# Patient Record
Sex: FEMALE | Race: WHITE | NOT HISPANIC OR LATINO
[De-identification: names, ages, dates, MRNs, and addresses within clinical notes are randomized per-mention and may not be internally consistent; named-entity substitution may affect disease eponyms.]

---

## 2017-11-03 ENCOUNTER — APPOINTMENT (OUTPATIENT)
Dept: OBGYN | Facility: CLINIC | Age: 72
End: 2017-11-03
Payer: MEDICARE

## 2017-11-03 VITALS
WEIGHT: 177 LBS | SYSTOLIC BLOOD PRESSURE: 126 MMHG | DIASTOLIC BLOOD PRESSURE: 75 MMHG | HEIGHT: 65 IN | HEART RATE: 88 BPM | BODY MASS INDEX: 29.49 KG/M2

## 2017-11-03 PROCEDURE — G0101: CPT

## 2017-11-03 PROCEDURE — 82270 OCCULT BLOOD FECES: CPT

## 2017-11-10 LAB — CYTOLOGY CVX/VAG DOC THIN PREP: NORMAL

## 2019-02-08 ENCOUNTER — APPOINTMENT (OUTPATIENT)
Dept: ORTHOPEDIC SURGERY | Facility: CLINIC | Age: 74
End: 2019-02-08
Payer: MEDICARE

## 2019-02-08 VITALS
HEART RATE: 94 BPM | WEIGHT: 165 LBS | SYSTOLIC BLOOD PRESSURE: 148 MMHG | BODY MASS INDEX: 27.49 KG/M2 | DIASTOLIC BLOOD PRESSURE: 84 MMHG | HEIGHT: 65 IN

## 2019-02-08 DIAGNOSIS — Z86.2 PERSONAL HISTORY OF DISEASES OF THE BLOOD AND BLOOD-FORMING ORGANS AND CERTAIN DISORDERS INVOLVING THE IMMUNE MECHANISM: ICD-10-CM

## 2019-02-08 DIAGNOSIS — M17.0 BILATERAL PRIMARY OSTEOARTHRITIS OF KNEE: ICD-10-CM

## 2019-02-08 PROCEDURE — 99214 OFFICE O/P EST MOD 30 MIN: CPT

## 2019-02-08 PROCEDURE — 73562 X-RAY EXAM OF KNEE 3: CPT | Mod: 50

## 2019-02-08 RX ORDER — AMLODIPINE BESYLATE 5 MG/1
TABLET ORAL
Refills: 0 | Status: ACTIVE | COMMUNITY

## 2019-02-08 RX ORDER — ASPIRIN 81 MG
TABLET,CHEWABLE ORAL
Refills: 0 | Status: ACTIVE | COMMUNITY

## 2019-02-08 RX ORDER — IRBESARTAN 300 MG/1
TABLET ORAL
Refills: 0 | Status: ACTIVE | COMMUNITY

## 2019-02-08 RX ORDER — CHROMIUM 200 MCG
TABLET ORAL
Refills: 0 | Status: ACTIVE | COMMUNITY

## 2019-02-08 NOTE — DISCUSSION/SUMMARY
[de-identified] : X-rays reviewed with the patient. At this time the patient is not ready to pursue operative intervention. She would like to continue to try conservative management of her knee pain. Physical therapy was prescribed. She'll try over-the-counter pain medicine. She will continue her home exercise program she'll return back to office when these modalities along improve her pain and then discuss total knee arthroplasty. The patient demonstrated understanding of the treatment plan. All questions were answered to the patient's satisfaction.

## 2019-02-08 NOTE — PHYSICAL EXAM
[de-identified] : The patient appears well nourished and in no apparent distress. The patient is alert and oriented to person, place, and time. Affect and mood appear normal. The head is normocephalic and atraumatic. The eyes reveal normal sclera and extra ocular muscles are intact. The mucous membranes are moist. Skin shows normal turgor with no evidence of eczema or psoriasis. No respiratory distress noted. MUSCULOSKELETAL / NEURO / VASCULAR:   SEE BELOW - \par   \par Neuro:\par Sensation: intact to fine & deep touch bilat. \par Motor function: Intact, 5/5\par \par Vascular: \par DP: \par Cap refill 1-2 sec. all toes\par Skin(LE): No cellulitis, edema, and min. venous varicosities bilaterally. No calf tenderness.\par  \par MS:\par Gait: The patient has a  antalgic limp.\par Function: There is mild difficulty mounting the exam table.\par \par Right Knee:\par Swelling: Mild\par Effusion: Minimal\par Alignment: Varus with -5 degrees\par Flexion contracture: Less than -3\par Tenderness: Medial compartment\par Incision: No\par Skin Temp: Normal temperature\par ROM: Flexion: 115 deg.; Extension: -3 deg,\par Laxity: No anterior posterior laxity, no laxity with valgus or varus stress\par PF crepitus: No; no pain\par Quadricep formation: Intact in Extension & Flexion:\par Quad/Ham St: 5/5\par \par \par Left Knee:\par Swelling: Mild\par Effusion: Minimal\par Alignment: Varus with -7 degrees\par Flexion contracture: -5\par Tenderness: Medial compartment\par Incision: No\par Skin Temp: Normal temperature\par ROM: Flexion: 115 deg.; Extension: -3 deg,\par Laxity: No anterior posterior laxity, little laxity with varus stress\par PF crepitus: yes; mild pain\par Quadricep formation: Intact in Extension & Flexion:\par Quad/Ham St: 5/5\par  [de-identified] : X-rays of both knees review. Both knee x-rays demonstrate moderate medial and patellofemoral degenerative changes with osteophyte formation.

## 2019-02-08 NOTE — HISTORY OF PRESENT ILLNESS
[de-identified] : Patient presents today for evaluation of left knee pain. The patient was last seen into the 16th of any pain. She previously had gel injections over 2 years ago. She reports that the injections did not improve her condition. She does report that she exercises daily at home on a bike. This does help her knee pain. She recently underwent cholecystectomy and has general fatigue syndrome following the surgery. She describes of medial knee pain. She does report of stiffness when sitting for an extended period time such as a car or plane. She does report of crackling of the left knee. She occasionally takes Tylenol for pain control. She's not any acute swelling. No traumatic injuries are reported. She is here to find out the extent of arthritic changes. She is unsure she wishes to proceed with knee replacement.\par \par Review of Systems-\par Constitutional: No fever or chills. \par Cardiovascular: No orthopnea or chest pain\par Pulmonary: No shortness of breath. \par GI: No nausea or vomiting or abdominal pain.\par Musculoskeletal: see HPI \par Psychiatric: No anxiety and depression.

## 2019-11-22 ENCOUNTER — APPOINTMENT (OUTPATIENT)
Dept: OBGYN | Facility: CLINIC | Age: 74
End: 2019-11-22
Payer: MEDICARE

## 2019-11-22 VITALS
BODY MASS INDEX: 27.49 KG/M2 | DIASTOLIC BLOOD PRESSURE: 80 MMHG | WEIGHT: 165 LBS | SYSTOLIC BLOOD PRESSURE: 120 MMHG | HEIGHT: 65 IN

## 2019-11-22 PROCEDURE — 82270 OCCULT BLOOD FECES: CPT

## 2019-11-22 PROCEDURE — G0101: CPT

## 2019-11-22 NOTE — PHYSICAL EXAM
[Awake] : awake [Alert] : alert [Acute Distress] : no acute distress [Mass] : no breast mass [Nipple Discharge] : no nipple discharge [Axillary LAD] : no axillary lymphadenopathy [Soft] : soft [Tender] : non tender [Oriented x3] : oriented to person, place, and time [Normal] : uterus [Cystocele] : a cystocele [Uterine Prolapse] : uterine prolapse [No Bleeding] : there was no active vaginal bleeding [Uterine Adnexae] : were not tender and not enlarged [Occult Blood] : occult blood test from digital rectal exam was negative [RRR, No Murmurs] : RRR, no murmurs [CTAB] : CTAB

## 2019-12-02 LAB — CYTOLOGY CVX/VAG DOC THIN PREP: NORMAL

## 2022-07-12 ENCOUNTER — APPOINTMENT (OUTPATIENT)
Dept: OBGYN | Facility: CLINIC | Age: 77
End: 2022-07-12

## 2022-07-12 VITALS
BODY MASS INDEX: 29.49 KG/M2 | DIASTOLIC BLOOD PRESSURE: 84 MMHG | SYSTOLIC BLOOD PRESSURE: 136 MMHG | WEIGHT: 177 LBS | HEIGHT: 65 IN

## 2022-07-12 DIAGNOSIS — Z00.00 ENCOUNTER FOR GENERAL ADULT MEDICAL EXAMINATION W/OUT ABNORMAL FINDINGS: ICD-10-CM

## 2022-07-12 PROCEDURE — G0101: CPT

## 2022-07-20 LAB — CYTOLOGY CVX/VAG DOC THIN PREP: NORMAL

## 2023-09-06 ENCOUNTER — NON-APPOINTMENT (OUTPATIENT)
Age: 78
End: 2023-09-06

## 2024-04-15 ENCOUNTER — APPOINTMENT (OUTPATIENT)
Dept: OBGYN | Facility: CLINIC | Age: 79
End: 2024-04-15
Payer: MEDICARE

## 2024-04-15 VITALS
WEIGHT: 175 LBS | DIASTOLIC BLOOD PRESSURE: 79 MMHG | SYSTOLIC BLOOD PRESSURE: 151 MMHG | BODY MASS INDEX: 29.16 KG/M2 | HEIGHT: 65 IN

## 2024-04-15 DIAGNOSIS — N39.0 URINARY TRACT INFECTION, SITE NOT SPECIFIED: ICD-10-CM

## 2024-04-15 DIAGNOSIS — N81.4 UTEROVAGINAL PROLAPSE, UNSPECIFIED: ICD-10-CM

## 2024-04-15 PROCEDURE — 99214 OFFICE O/P EST MOD 30 MIN: CPT

## 2024-04-15 RX ORDER — ESTRADIOL 0.1 MG/G
0.1 CREAM VAGINAL
Qty: 1 | Refills: 1 | Status: ACTIVE | COMMUNITY
Start: 2024-04-15 | End: 1900-01-01

## 2024-04-15 NOTE — PHYSICAL EXAM
[Labia Majora] : normal [Labia Minora] : normal [Atrophy] : atrophy [Cystocele] : a cystocele [Normal] : normal [Uterine Adnexae] : normal [FreeTextEntry1] : 2 cm right groin lump, mildly tender.

## 2024-04-15 NOTE — PLAN
[FreeTextEntry1] : Recurrent UTIs.  I discussed with the patient risks and benefits of topical vaginal estrogens which she has been helpful in reducing the frequency of recurrent urinary tract infections.  She is prescribed Estrace cream and instructed in its use.  Her urologist was concerned about her cystocele and prolapse, we discussed the management available including pessaries, and surgery.  The patient has small protrusion from the vagina which is usually spontaneously resolved, and has no pain and does not affect her activity, and she would prefer to avoid surgery or a pessary.  She has a lump in her right groin which is tender and I am sending her for an ultrasound to have that evaluated and suggested she should probably see a general surgeon for that.  We spent 30 minutes in consultation.

## 2024-04-15 NOTE — HISTORY OF PRESENT ILLNESS
[FreeTextEntry1] : 78 yo pt w hx prolapse here for consultation.  pt has felt asymptomatic. having wup w uro- cysto, ct all wnl for blood or bacteria in blood. urologist wanted gyn consult.  recently noted a lump in groin, was large while in [place], now has shrunk back down. was size of an egg, was tender, now can feel hard spot where the lump was before it shrunk down.  at times has a small egglike protrusion from vagina, shrinks back into vagina after getting dressed.   had ct end of last summer.  noticed groin lump about 2 months ago.

## 2024-07-12 DIAGNOSIS — Z13.820 ENCOUNTER FOR SCREENING FOR OSTEOPOROSIS: ICD-10-CM

## 2024-07-12 DIAGNOSIS — Z12.39 ENCOUNTER FOR OTHER SCREENING FOR MALIGNANT NEOPLASM OF BREAST: ICD-10-CM

## 2024-07-12 DIAGNOSIS — Z01.419 ENCOUNTER FOR GYNECOLOGICAL EXAMINATION (GENERAL) (ROUTINE) W/OUT ABNORMAL FINDINGS: ICD-10-CM

## 2024-07-15 ENCOUNTER — APPOINTMENT (OUTPATIENT)
Dept: OBGYN | Facility: CLINIC | Age: 79
End: 2024-07-15
Payer: MEDICARE

## 2024-07-15 VITALS
WEIGHT: 177 LBS | SYSTOLIC BLOOD PRESSURE: 143 MMHG | BODY MASS INDEX: 29.49 KG/M2 | DIASTOLIC BLOOD PRESSURE: 83 MMHG | HEIGHT: 65 IN

## 2024-07-15 DIAGNOSIS — Z00.00 ENCOUNTER FOR GENERAL ADULT MEDICAL EXAMINATION W/OUT ABNORMAL FINDINGS: ICD-10-CM

## 2024-07-15 PROCEDURE — G0101: CPT

## 2024-07-17 LAB — HPV HIGH+LOW RISK DNA PNL CVX: NOT DETECTED

## 2024-07-22 LAB — CYTOLOGY CVX/VAG DOC THIN PREP: NORMAL
